# Patient Record
Sex: MALE | Race: WHITE | NOT HISPANIC OR LATINO | Employment: FULL TIME | URBAN - METROPOLITAN AREA
[De-identification: names, ages, dates, MRNs, and addresses within clinical notes are randomized per-mention and may not be internally consistent; named-entity substitution may affect disease eponyms.]

---

## 2018-12-03 ENCOUNTER — HOSPITAL ENCOUNTER (EMERGENCY)
Facility: HOSPITAL | Age: 43
Discharge: HOME | End: 2018-12-03
Attending: EMERGENCY MEDICINE | Admitting: EMERGENCY MEDICINE

## 2018-12-03 ENCOUNTER — APPOINTMENT (OUTPATIENT)
Dept: GENERAL RADIOLOGY | Facility: HOSPITAL | Age: 43
End: 2018-12-03

## 2018-12-03 VITALS
HEART RATE: 68 BPM | BODY MASS INDEX: 19.26 KG/M2 | WEIGHT: 130 LBS | TEMPERATURE: 97.5 F | OXYGEN SATURATION: 99 % | DIASTOLIC BLOOD PRESSURE: 86 MMHG | SYSTOLIC BLOOD PRESSURE: 117 MMHG | HEIGHT: 69 IN | RESPIRATION RATE: 15 BRPM

## 2018-12-03 DIAGNOSIS — R07.2 PRECORDIAL CHEST PAIN: Primary | ICD-10-CM

## 2018-12-03 LAB
ALBUMIN SERPL-MCNC: 3.7 G/DL (ref 3.5–5.2)
ALBUMIN/GLOB SERPL: 1.7 G/DL
ALP SERPL-CCNC: 65 U/L (ref 39–117)
ALT SERPL W P-5'-P-CCNC: 15 U/L (ref 1–41)
ANION GAP SERPL CALCULATED.3IONS-SCNC: 7.8 MMOL/L
AST SERPL-CCNC: 19 U/L (ref 1–40)
BASOPHILS # BLD AUTO: 0.03 10*3/MM3 (ref 0–0.2)
BASOPHILS NFR BLD AUTO: 0.2 % (ref 0–1.5)
BILIRUB SERPL-MCNC: 0.2 MG/DL (ref 0.1–1.2)
BUN BLD-MCNC: 15 MG/DL (ref 6–20)
BUN/CREAT SERPL: 18.5 (ref 7–25)
CALCIUM SPEC-SCNC: 9 MG/DL (ref 8.6–10.5)
CHLORIDE SERPL-SCNC: 105 MMOL/L (ref 98–107)
CO2 SERPL-SCNC: 25.2 MMOL/L (ref 22–29)
CREAT BLD-MCNC: 0.81 MG/DL (ref 0.76–1.27)
DEPRECATED RDW RBC AUTO: 47.6 FL (ref 37–54)
EOSINOPHIL # BLD AUTO: 0.48 10*3/MM3 (ref 0–0.7)
EOSINOPHIL NFR BLD AUTO: 3.7 % (ref 0.3–6.2)
ERYTHROCYTE [DISTWIDTH] IN BLOOD BY AUTOMATED COUNT: 13.3 % (ref 11.5–14.5)
GFR SERPL CREATININE-BSD FRML MDRD: 103 ML/MIN/1.73
GLOBULIN UR ELPH-MCNC: 2.2 GM/DL
GLUCOSE BLD-MCNC: 102 MG/DL (ref 65–99)
HCT VFR BLD AUTO: 46.7 % (ref 40.4–52.2)
HGB BLD-MCNC: 15.1 G/DL (ref 13.7–17.6)
IMM GRANULOCYTES # BLD: 0.04 10*3/MM3 (ref 0–0.03)
IMM GRANULOCYTES NFR BLD: 0.3 % (ref 0–0.5)
INR PPP: 1.19 (ref 0.9–1.1)
LYMPHOCYTES # BLD AUTO: 3 10*3/MM3 (ref 0.9–4.8)
LYMPHOCYTES NFR BLD AUTO: 23.4 % (ref 19.6–45.3)
MCH RBC QN AUTO: 31.2 PG (ref 27–32.7)
MCHC RBC AUTO-ENTMCNC: 32.3 G/DL (ref 32.6–36.4)
MCV RBC AUTO: 96.5 FL (ref 79.8–96.2)
MONOCYTES # BLD AUTO: 0.52 10*3/MM3 (ref 0.2–1.2)
MONOCYTES NFR BLD AUTO: 4.1 % (ref 5–12)
NEUTROPHILS # BLD AUTO: 8.74 10*3/MM3 (ref 1.9–8.1)
NEUTROPHILS NFR BLD AUTO: 68.3 % (ref 42.7–76)
PLATELET # BLD AUTO: 191 10*3/MM3 (ref 140–500)
PMV BLD AUTO: 10.4 FL (ref 6–12)
POTASSIUM BLD-SCNC: 4.2 MMOL/L (ref 3.5–5.2)
PROT SERPL-MCNC: 5.9 G/DL (ref 6–8.5)
PROTHROMBIN TIME: 14.9 SECONDS (ref 11.7–14.2)
RBC # BLD AUTO: 4.84 10*6/MM3 (ref 4.6–6)
SODIUM BLD-SCNC: 138 MMOL/L (ref 136–145)
TROPONIN T SERPL-MCNC: <0.01 NG/ML (ref 0–0.03)
TROPONIN T SERPL-MCNC: <0.01 NG/ML (ref 0–0.03)
WBC NRBC COR # BLD: 12.81 10*3/MM3 (ref 4.5–10.7)

## 2018-12-03 PROCEDURE — 85610 PROTHROMBIN TIME: CPT | Performed by: EMERGENCY MEDICINE

## 2018-12-03 PROCEDURE — 84484 ASSAY OF TROPONIN QUANT: CPT | Performed by: EMERGENCY MEDICINE

## 2018-12-03 PROCEDURE — 93005 ELECTROCARDIOGRAM TRACING: CPT | Performed by: EMERGENCY MEDICINE

## 2018-12-03 PROCEDURE — 99285 EMERGENCY DEPT VISIT HI MDM: CPT

## 2018-12-03 PROCEDURE — 93005 ELECTROCARDIOGRAM TRACING: CPT

## 2018-12-03 PROCEDURE — 85025 COMPLETE CBC W/AUTO DIFF WBC: CPT | Performed by: EMERGENCY MEDICINE

## 2018-12-03 PROCEDURE — 71046 X-RAY EXAM CHEST 2 VIEWS: CPT

## 2018-12-03 PROCEDURE — 80053 COMPREHEN METABOLIC PANEL: CPT | Performed by: EMERGENCY MEDICINE

## 2018-12-03 PROCEDURE — 93010 ELECTROCARDIOGRAM REPORT: CPT | Performed by: INTERNAL MEDICINE

## 2018-12-03 RX ORDER — SODIUM CHLORIDE 0.9 % (FLUSH) 0.9 %
10 SYRINGE (ML) INJECTION AS NEEDED
Status: DISCONTINUED | OUTPATIENT
Start: 2018-12-03 | End: 2018-12-03 | Stop reason: HOSPADM

## 2018-12-03 NOTE — ED PROVIDER NOTES
" EMERGENCY DEPARTMENT ENCOUNTER    Room Number:  11/11  Date seen:  12/3/2018  Time seen: 3:16 PM  PCP: Provider, No Known  Historian: Patient      HPI:  Chief Complaint: Chest Pain  A complete HPI/ROS/PMH/PSH/SH/FH are unobtainable due to: N/A  Context: Jus Leary is a 43 y.o. male who presents to the ED c/o constant \"tight\" left-sided chest pain onset at about 10:00 AM today. Pt reports that his chest pain began shortly after pt donated plasma today. Pt denies dyspnea, diaphoresis, nausea, vomiting, palpitations, abdominal pain, BLE edema, bilateral/unilateral calf pain, and dizziness/lightheadedness. Pt also denies family h/o early cardiac disease. No recent trauma/surgery/immobilization in the past 4 weeks. No h/o VTE. No hemoptysis. No unilateral leg swelling. No exogenous estrogen use.   Age < 50. HR < 100. SpO2 >94% on room air.  There are no other complaints at this time.     Pain Location: Left side of the chest  Radiation: None  Quality: \"tightness\"  Intensity/Severity: Moderate  Duration: Onset at about 10:00 AM today  Onset quality: Gradual in onset  Timing: Constant  Progression: Unchanged  Aggravating Factors: Nothing  Alleviating Factors: Nothing  Previous Episodes: None  Treatment before arrival: Unknown  Associated Symptoms: None        PAST MEDICAL HISTORY  Active Ambulatory Problems     Diagnosis Date Noted   • No Active Ambulatory Problems     Resolved Ambulatory Problems     Diagnosis Date Noted   • No Resolved Ambulatory Problems     No Additional Past Medical History         PAST SURGICAL HISTORY  History reviewed. No pertinent surgical history.      FAMILY HISTORY  History reviewed. No pertinent family history.      SOCIAL HISTORY  Social History     Socioeconomic History   • Marital status:      Spouse name: Not on file   • Number of children: Not on file   • Years of education: Not on file   • Highest education level: Not on file   Social Needs   • Financial resource strain: Not " "on file   • Food insecurity - worry: Not on file   • Food insecurity - inability: Not on file   • Transportation needs - medical: Not on file   • Transportation needs - non-medical: Not on file   Occupational History   • Not on file   Tobacco Use   • Smoking status: Never Smoker   Substance and Sexual Activity   • Alcohol use: No     Frequency: Never   • Drug use: No   • Sexual activity: Not on file   Other Topics Concern   • Not on file   Social History Narrative   • Not on file         ALLERGIES  Penicillins        REVIEW OF SYSTEMS  Review of Systems   Constitutional: Negative for diaphoresis and fever.   HENT: Negative for sore throat.    Respiratory: Negative for shortness of breath.    Cardiovascular: Positive for chest pain (constant, \"tight\" L sided). Negative for palpitations and leg swelling.   Gastrointestinal: Negative for abdominal pain, nausea and vomiting.   Endocrine: Negative for polyuria.   Genitourinary: Negative for dysuria.   Musculoskeletal: Negative for myalgias (calf pain) and neck pain.   Skin: Negative for rash.   Neurological: Negative for dizziness, light-headedness and headaches.   All other systems reviewed and are negative.             PHYSICAL EXAM  ED Triage Vitals   Temp Heart Rate Resp BP SpO2   12/03/18 1343 12/03/18 1343 12/03/18 1343 12/03/18 1403 12/03/18 1343   97.5 °F (36.4 °C) 97 16 110/88 98 %      Temp src Heart Rate Source Patient Position BP Location FiO2 (%)   12/03/18 1343 -- -- -- --   Tympanic             GENERAL: Pt is not distressed  HENT: nares patent  EYES: no scleral icterus  CV: regular rhythm, regular rate. Reproducible anterior chest wall tenderness  RESPIRATORY: normal effort. Lungs are CTAB.  ABDOMEN: soft and nontender  MUSCULOSKELETAL: no deformity. No BLE edema. Calves are nontender bilaterally  NEURO: alert, PATTON, FC  SKIN: warm, dry    Vital signs and nursing notes reviewed.          LAB RESULTS  Recent Results (from the past 24 hour(s))   Comprehensive " Metabolic Panel    Collection Time: 12/03/18  2:22 PM   Result Value Ref Range    Glucose 102 (H) 65 - 99 mg/dL    BUN 15 6 - 20 mg/dL    Creatinine 0.81 0.76 - 1.27 mg/dL    Sodium 138 136 - 145 mmol/L    Potassium 4.2 3.5 - 5.2 mmol/L    Chloride 105 98 - 107 mmol/L    CO2 25.2 22.0 - 29.0 mmol/L    Calcium 9.0 8.6 - 10.5 mg/dL    Total Protein 5.9 (L) 6.0 - 8.5 g/dL    Albumin 3.70 3.50 - 5.20 g/dL    ALT (SGPT) 15 1 - 41 U/L    AST (SGOT) 19 1 - 40 U/L    Alkaline Phosphatase 65 39 - 117 U/L    Total Bilirubin 0.2 0.1 - 1.2 mg/dL    eGFR Non African Amer 103 >60 mL/min/1.73    Globulin 2.2 gm/dL    A/G Ratio 1.7 g/dL    BUN/Creatinine Ratio 18.5 7.0 - 25.0    Anion Gap 7.8 mmol/L   Protime-INR    Collection Time: 12/03/18  2:22 PM   Result Value Ref Range    Protime 14.9 (H) 11.7 - 14.2 Seconds    INR 1.19 (H) 0.90 - 1.10   Troponin    Collection Time: 12/03/18  2:22 PM   Result Value Ref Range    Troponin T <0.010 0.000 - 0.030 ng/mL   CBC Auto Differential    Collection Time: 12/03/18  2:22 PM   Result Value Ref Range    WBC 12.81 (H) 4.50 - 10.70 10*3/mm3    RBC 4.84 4.60 - 6.00 10*6/mm3    Hemoglobin 15.1 13.7 - 17.6 g/dL    Hematocrit 46.7 40.4 - 52.2 %    MCV 96.5 (H) 79.8 - 96.2 fL    MCH 31.2 27.0 - 32.7 pg    MCHC 32.3 (L) 32.6 - 36.4 g/dL    RDW 13.3 11.5 - 14.5 %    RDW-SD 47.6 37.0 - 54.0 fl    MPV 10.4 6.0 - 12.0 fL    Platelets 191 140 - 500 10*3/mm3    Neutrophil % 68.3 42.7 - 76.0 %    Lymphocyte % 23.4 19.6 - 45.3 %    Monocyte % 4.1 (L) 5.0 - 12.0 %    Eosinophil % 3.7 0.3 - 6.2 %    Basophil % 0.2 0.0 - 1.5 %    Immature Grans % 0.3 0.0 - 0.5 %    Neutrophils, Absolute 8.74 (H) 1.90 - 8.10 10*3/mm3    Lymphocytes, Absolute 3.00 0.90 - 4.80 10*3/mm3    Monocytes, Absolute 0.52 0.20 - 1.20 10*3/mm3    Eosinophils, Absolute 0.48 0.00 - 0.70 10*3/mm3    Basophils, Absolute 0.03 0.00 - 0.20 10*3/mm3    Immature Grans, Absolute 0.04 (H) 0.00 - 0.03 10*3/mm3   Troponin    Collection Time: 12/03/18   4:21 PM   Result Value Ref Range    Troponin T <0.010 0.000 - 0.030 ng/mL       Ordered the above labs and reviewed the results.        RADIOLOGY  XR Chest 2 View   Final Result   No focal pulmonary consolidation. Follow-up as clinical   indications persist.       This report was finalized on 12/3/2018 3:47 PM by Dr. Glenroy Mann M.D.                 Ordered the above noted radiological studies. Reviewed by me in PACS.           PROCEDURES  Procedures        EKG:           EKG time: 13:51  Rhythm/Rate: NSR rate 72  P waves and AR: Normal P waves  QRS, axis: Incomplete RBBB   ST and T waves: Nonspecific ST changes     Interpreted Contemporaneously by me, independently viewed  No old for comparison.      HEART SCORE:    History  Highly suspicious              2    Moderately suspicious             1    Slightly or non-suspicious             0    ECG  Significant ST depression              2    Nonspecific repol disturbance       1    Normal                           0    Age  > or = 65                          2     45-65                           1    < or = 44                          0    Risk factors (hypercholesterolemia, HTN, DM, smoking, pos fam hx, obesity)                            > or = to 3 RF for atherosclerotic dx   2    1 or 2                 1    No risk factors    0    Troponin > or = 3x normal limit               2    1-3x normal limit    1    < or = Normal limit    0        HEART Score Key:  Scores 0-3: 0.9-1.7% risk of adverse cardiac event. In the HEART Score study, these patients were discharged (0.99% in the retrospective study, 1.7% in the prospective study)  Scores 4-6: 12-16.6% risk of adverse cardiac event. In the HEART Score study, these patients were admitted to the hospital. (11.6% retrospective, 16.6% prospective)  Scores ?7: 50-65% risk of adverse cardiac event. In the HEART Score study, these patients were candidates for early invasive measures. (65.2% retrospective, 50.1%  prospective)      This patient's HEART score is 2.          MEDICATIONS GIVEN IN ER  Medications   sodium chloride 0.9 % flush 10 mL (not administered)                   PROGRESS AND CONSULTS   1524 Ordered troponin and CXR for further evaluation.    1708 Rechecked with pt, who is resting comfortably, and discussed that there is no acute abnormalities in labs and CXR. Plan to discharge. Pt should f/u with cardiology. Pt understands and agrees with the plan, all questions answered.        MEDICAL DECISION MAKING      MDM  Number of Diagnoses or Management Options  Precordial chest pain:   Diagnosis management comments: DDx includes ACS, PE, PNA, TAD, PTX, MSK pain, dyspepsia, anxiety, myocarditis/pericarditis, esophageal rupture, pancreatitis.     HEART score 2.  Troponin negative x 2.     PERC negative.     Sent message for outpatient stress test. Gave good RTER precautions. I have low suspicion for TAD or PE.          Amount and/or Complexity of Data Reviewed  Clinical lab tests: ordered and reviewed (Troponin= <0.010, INR= 1.19, WBC= 12.81)  Tests in the radiology section of CPT®: ordered and reviewed (CXR shows nothing acute.)  Tests in the medicine section of CPT®: reviewed and ordered (See procedure notes for EKG.)  Decide to obtain previous medical records or to obtain history from someone other than the patient: yes  Independent visualization of images, tracings, or specimens: yes (No pneumonia)    Patient Progress  Patient progress: stable             DIAGNOSIS  Final diagnoses:   Precordial chest pain         DISPOSITION  DISCHARGE    Patient discharged in stable condition.    Reviewed implications of results, diagnosis, meds, responsibility to follow up, warning signs and symptoms of possible worsening, potential complications and reasons to return to ER, including new or worsening sxs.    Patient/Family voiced understanding of above instructions.    Discussed plan for discharge, as there is no emergent  indication for admission. Patient referred to primary care provider for BP management due to today's BP. Pt/family is agreeable and understands need for follow up and repeat testing.  Pt is aware that discharge does not mean that nothing is wrong but it indicates no emergency is present that requires admission and they must continue care with follow-up as given below or physician of their choice.     FOLLOW-UP  [facility]  [address]  [facility] [address]  [phone]  Schedule an appointment as soon as possible for a visit in 3 days  for stress test         Medication List      No changes were made to your prescriptions during this visit.                 Latest Documented Vital Signs:  As of 5:10 PM  BP- 117/86 HR- 65 Temp- 97.5 °F (36.4 °C) (Tympanic) O2 sat- 97%        --  Documentation assistance provided by sherly Black and Cheryl Dave for Dr. Viral MD.  Information recorded by the scribe was done at my direction and has been verified and validated by me.                   Rico Black  12/03/18 1524       Cheryl Dave  12/03/18 2430       Mack Stratton II, MD  12/04/18 6314

## 2018-12-03 NOTE — ED NOTES
Pt reports before giving plasma pt began to have an episode of feeling light headed and diaphoretic. Pt reports after having left sided chest pain and left arm numbness. Pt was able to hold left arm.     Lachelle Real RN  12/03/18 1753

## 2018-12-04 ENCOUNTER — HOSPITAL ENCOUNTER (EMERGENCY)
Facility: HOSPITAL | Age: 43
Discharge: HOME | End: 2018-12-04
Attending: EMERGENCY MEDICINE | Admitting: EMERGENCY MEDICINE

## 2018-12-04 VITALS
HEIGHT: 69 IN | RESPIRATION RATE: 18 BRPM | SYSTOLIC BLOOD PRESSURE: 107 MMHG | WEIGHT: 131.5 LBS | DIASTOLIC BLOOD PRESSURE: 80 MMHG | TEMPERATURE: 96.9 F | BODY MASS INDEX: 19.48 KG/M2 | OXYGEN SATURATION: 98 % | HEART RATE: 62 BPM

## 2018-12-04 DIAGNOSIS — R07.2 PRECORDIAL CHEST PAIN: Primary | ICD-10-CM

## 2018-12-04 LAB
ALBUMIN SERPL-MCNC: 4 G/DL (ref 3.5–5.2)
ALBUMIN/GLOB SERPL: 1.8 G/DL
ALP SERPL-CCNC: 72 U/L (ref 39–117)
ALT SERPL W P-5'-P-CCNC: 18 U/L (ref 1–41)
ANION GAP SERPL CALCULATED.3IONS-SCNC: 6.4 MMOL/L
AST SERPL-CCNC: 22 U/L (ref 1–40)
BASOPHILS # BLD AUTO: 0.06 10*3/MM3 (ref 0–0.2)
BASOPHILS NFR BLD AUTO: 0.6 % (ref 0–1.5)
BILIRUB SERPL-MCNC: <0.2 MG/DL (ref 0.1–1.2)
BUN BLD-MCNC: 10 MG/DL (ref 6–20)
BUN/CREAT SERPL: 12.3 (ref 7–25)
CALCIUM SPEC-SCNC: 9.2 MG/DL (ref 8.6–10.5)
CHLORIDE SERPL-SCNC: 106 MMOL/L (ref 98–107)
CO2 SERPL-SCNC: 26.6 MMOL/L (ref 22–29)
CREAT BLD-MCNC: 0.81 MG/DL (ref 0.76–1.27)
D DIMER PPP FEU-MCNC: <0.27 MCGFEU/ML (ref 0–0.49)
DEPRECATED RDW RBC AUTO: 45.3 FL (ref 37–54)
EOSINOPHIL # BLD AUTO: 0.62 10*3/MM3 (ref 0–0.7)
EOSINOPHIL NFR BLD AUTO: 6 % (ref 0.3–6.2)
ERYTHROCYTE [DISTWIDTH] IN BLOOD BY AUTOMATED COUNT: 13.4 % (ref 11.5–14.5)
GFR SERPL CREATININE-BSD FRML MDRD: 103 ML/MIN/1.73
GLOBULIN UR ELPH-MCNC: 2.2 GM/DL
GLUCOSE BLD-MCNC: 90 MG/DL (ref 65–99)
HCT VFR BLD AUTO: 44.4 % (ref 40.4–52.2)
HGB BLD-MCNC: 15.5 G/DL (ref 13.7–17.6)
IMM GRANULOCYTES # BLD: 0.02 10*3/MM3 (ref 0–0.03)
IMM GRANULOCYTES NFR BLD: 0.2 % (ref 0–0.5)
LYMPHOCYTES # BLD AUTO: 2.96 10*3/MM3 (ref 0.9–4.8)
LYMPHOCYTES NFR BLD AUTO: 28.5 % (ref 19.6–45.3)
MCH RBC QN AUTO: 32.2 PG (ref 27–32.7)
MCHC RBC AUTO-ENTMCNC: 34.9 G/DL (ref 32.6–36.4)
MCV RBC AUTO: 92.1 FL (ref 79.8–96.2)
MONOCYTES # BLD AUTO: 0.48 10*3/MM3 (ref 0.2–1.2)
MONOCYTES NFR BLD AUTO: 4.6 % (ref 5–12)
NEUTROPHILS # BLD AUTO: 6.27 10*3/MM3 (ref 1.9–8.1)
NEUTROPHILS NFR BLD AUTO: 60.3 % (ref 42.7–76)
PLATELET # BLD AUTO: 199 10*3/MM3 (ref 140–500)
PMV BLD AUTO: 10.5 FL (ref 6–12)
POTASSIUM BLD-SCNC: 4.4 MMOL/L (ref 3.5–5.2)
PROT SERPL-MCNC: 6.2 G/DL (ref 6–8.5)
RBC # BLD AUTO: 4.82 10*6/MM3 (ref 4.6–6)
SODIUM BLD-SCNC: 139 MMOL/L (ref 136–145)
TROPONIN T SERPL-MCNC: <0.01 NG/ML (ref 0–0.03)
WBC NRBC COR # BLD: 10.39 10*3/MM3 (ref 4.5–10.7)

## 2018-12-04 PROCEDURE — 85379 FIBRIN DEGRADATION QUANT: CPT | Performed by: PHYSICIAN ASSISTANT

## 2018-12-04 PROCEDURE — 93005 ELECTROCARDIOGRAM TRACING: CPT

## 2018-12-04 PROCEDURE — 80053 COMPREHEN METABOLIC PANEL: CPT | Performed by: PHYSICIAN ASSISTANT

## 2018-12-04 PROCEDURE — 84484 ASSAY OF TROPONIN QUANT: CPT | Performed by: PHYSICIAN ASSISTANT

## 2018-12-04 PROCEDURE — 93010 ELECTROCARDIOGRAM REPORT: CPT | Performed by: INTERNAL MEDICINE

## 2018-12-04 PROCEDURE — 99283 EMERGENCY DEPT VISIT LOW MDM: CPT

## 2018-12-04 PROCEDURE — 85025 COMPLETE CBC W/AUTO DIFF WBC: CPT | Performed by: PHYSICIAN ASSISTANT

## 2018-12-04 PROCEDURE — 93005 ELECTROCARDIOGRAM TRACING: CPT | Performed by: EMERGENCY MEDICINE

## 2018-12-04 RX ORDER — SODIUM CHLORIDE 0.9 % (FLUSH) 0.9 %
10 SYRINGE (ML) INJECTION AS NEEDED
Status: DISCONTINUED | OUTPATIENT
Start: 2018-12-04 | End: 2018-12-04 | Stop reason: HOSPADM

## 2018-12-04 NOTE — DISCHARGE INSTRUCTIONS
Call the cardiologist office tomorrow to schedule an outpatient stress test. Return to the Emergency Department if you have any worsening pain, shortness of breath, if you develop a fever, or any other concerns.

## 2018-12-04 NOTE — ED PROVIDER NOTES
"Pt is a 43 y.o. male who presents to the ED complaining of CP that started yesterday. Pt was seen yesterday for \"tight\" diffuse chest pain and went home with negative work up. Pt states the pain woke him up this morning at 0400 and is \"sharp\" and left of sternum. Pt states pain is exacerbated with walking. Pt denies N/V, or SOA. Pt doesn't smoke. Pt denies Hx of DM.     On exam,  Constitutional: Mild distress  Cardiovascular: HRRR, no murmur   Pulmonary/Chest: Lungs CTAB. Pain reproducible with left chest wall and movement     Labs reviewed.   Troponin negative. D-Dimer negative.     Plan: D/w Cardiology       MD ATTESTATION NOTE    The TORI and I have discussed this patient's history, physical exam, and treatment plan.  I have reviewed the documentation and personally had a face to face interaction with the patient. I affirm the documentation and agree with the treatment and plan.  The attached note describes my personal findings.      Documentation assistance provided by sherly Gordillo for Dr. Ling. Information recorded by the scribe was done at my direction and has been verified and validated by me.             Jus Gordillo  12/04/18 2516       Lucho Ling MD  12/04/18 2206    "

## 2018-12-04 NOTE — ED PROVIDER NOTES
EMERGENCY DEPARTMENT ENCOUNTER    CHIEF COMPLAINT  Chief Complaint: Chest Pain  History given by: Patient  History limited by:   Room Number: 26/26  PMD: Provider, No Known      HPI:  Pt is a 43 y.o. male who presents complaining of R lower chest pain that began today at 1200 while at rest. Pt describes the pain as a sharpness. He denies any SOA, palpitations, or N/V. Pt was seen here for same yesterday, but states the pain at that time was a pressure. Pt denies any cardiac hx. He reports a family cardiac hx, but no early death.     Duration: 4.75 hours  Onset: sudden  Timing: waxing and waning  Location: R lower chest  Radiation: none  Quality: sharpness  Intensity/Severity: moderate  Progression: unchanged  Associated Symptoms: none  Aggravating Factors: none  Alleviating Factors: none  Previous Episodes: similar episode yesterday. Quality is different today.  Treatment before arrival: seen here for same yesterday.     PAST MEDICAL HISTORY  Active Ambulatory Problems     Diagnosis Date Noted   • No Active Ambulatory Problems     Resolved Ambulatory Problems     Diagnosis Date Noted   • No Resolved Ambulatory Problems     No Additional Past Medical History       PAST SURGICAL HISTORY  History reviewed. No pertinent surgical history.    FAMILY HISTORY  History reviewed. No pertinent family history.    SOCIAL HISTORY  Social History     Socioeconomic History   • Marital status:      Spouse name: Not on file   • Number of children: Not on file   • Years of education: Not on file   • Highest education level: Not on file   Social Needs   • Financial resource strain: Not on file   • Food insecurity - worry: Not on file   • Food insecurity - inability: Not on file   • Transportation needs - medical: Not on file   • Transportation needs - non-medical: Not on file   Occupational History   • Not on file   Tobacco Use   • Smoking status: Never Smoker   Substance and Sexual Activity   • Alcohol use: No     Frequency:  Never   • Drug use: No   • Sexual activity: Not on file   Other Topics Concern   • Not on file   Social History Narrative   • Not on file       ALLERGIES  Penicillins    REVIEW OF SYSTEMS  Review of Systems   Constitutional: Negative for activity change, appetite change and fever.   Respiratory: Negative for cough and shortness of breath.    Cardiovascular: Positive for chest pain. Negative for leg swelling.   Gastrointestinal: Negative for abdominal pain, diarrhea and vomiting.   Genitourinary: Negative for decreased urine volume and dysuria.   Musculoskeletal: Negative for neck pain.   Skin: Negative for rash and wound.   Neurological: Negative for weakness, numbness and headaches.   All other systems reviewed and are negative.      PHYSICAL EXAM  ED Triage Vitals   Temp Heart Rate Resp BP SpO2   12/04/18 1542 12/04/18 1542 12/04/18 1542 12/04/18 1600 12/04/18 1542   96.9 °F (36.1 °C) 94 18 122/89 97 %      Temp src Heart Rate Source Patient Position BP Location FiO2 (%)   12/04/18 1542 12/04/18 1542 -- -- --   Tympanic Monitor          Physical Exam   Constitutional: He is oriented to person, place, and time. No distress.   HENT:   Head: Normocephalic and atraumatic.   Eyes: EOM are normal. Pupils are equal, round, and reactive to light.   Neck: Normal range of motion. Neck supple.   Cardiovascular: Normal rate, regular rhythm and normal heart sounds.   Pulmonary/Chest: Effort normal and breath sounds normal. No respiratory distress. He exhibits tenderness (mild right sided).   Abdominal: Soft. There is no tenderness. There is no rebound and no guarding.   Musculoskeletal: Normal range of motion. He exhibits no edema.   Neurological: He is alert and oriented to person, place, and time. He has normal sensation and normal strength.   Skin: Skin is warm and dry.   Psychiatric: Mood and affect normal.   Nursing note and vitals reviewed.      LAB RESULTS  Lab Results (last 24 hours)     Procedure Component Value Units  Date/Time    CBC & Differential [[id number]] Collected:  12/04/18 1650    Specimen:  Blood Updated:  12/04/18 1705    Narrative:       The following orders were created for panel order CBC & Differential.  Procedure                               Abnormality         Status                     ---------                               -----------         ------                     CBC Auto Differential[[id number]]        Abnormal            Final result                 Please view results for these tests on the individual orders.    Troponin [[id number]]  (Normal) Collected:  12/04/18 1650    Specimen:  Blood Updated:  12/04/18 1721     Troponin T <0.010 ng/mL     Narrative:       Troponin T Reference Ranges:  Less than 0.03 ng/mL:    Negative for AMI  0.03 to 0.09 ng/mL:      Indeterminant for AMI  Greater than 0.09 ng/mL: Positive for AMI    Comprehensive Metabolic Panel [[id number]] Collected:  12/04/18 1650    Specimen:  Blood Updated:  12/04/18 1725     Glucose 90 mg/dL      BUN 10 mg/dL      Creatinine 0.81 mg/dL      Sodium 139 mmol/L      Potassium 4.4 mmol/L      Chloride 106 mmol/L      CO2 26.6 mmol/L      Calcium 9.2 mg/dL      Total Protein 6.2 g/dL      Albumin 4.00 g/dL      ALT (SGPT) 18 U/L      AST (SGOT) 22 U/L      Alkaline Phosphatase 72 U/L      Total Bilirubin <0.2 mg/dL      eGFR Non African Amer 103 mL/min/1.73      Globulin 2.2 gm/dL      A/G Ratio 1.8 g/dL      BUN/Creatinine Ratio 12.3     Anion Gap 6.4 mmol/L     D-dimer, Quantitative [[id number]]  (Normal) Collected:  12/04/18 1650    Specimen:  Blood Updated:  12/04/18 1718     D-Dimer, Quantitative <0.27 MCGFEU/mL     Narrative:       The [employer]ago D-Dimer test used in conjunction with a clinical pretest probability (PTP) assessment model, has been approved by the FDA to rule out the presence of venous thromboembolism (VTE) in outpatients suspected of deep venous thrombosis (DVT) or pulmonary embolism (PE).     CBC Auto Differential [[id number]]   (Abnormal) Collected:  12/04/18 1650    Specimen:  Blood Updated:  12/04/18 1705     WBC 10.39 10*3/mm3      RBC 4.82 10*6/mm3      Hemoglobin 15.5 g/dL      Hematocrit 44.4 %      MCV 92.1 fL      MCH 32.2 pg      MCHC 34.9 g/dL      RDW 13.4 %      RDW-SD 45.3 fl      MPV 10.5 fL      Platelets 199 10*3/mm3      Neutrophil % 60.3 %      Lymphocyte % 28.5 %      Monocyte % 4.6 %      Eosinophil % 6.0 %      Basophil % 0.6 %      Immature Grans % 0.2 %      Neutrophils, Absolute 6.27 10*3/mm3      Lymphocytes, Absolute 2.96 10*3/mm3      Monocytes, Absolute 0.48 10*3/mm3      Eosinophils, Absolute 0.62 10*3/mm3      Basophils, Absolute 0.06 10*3/mm3      Immature Grans, Absolute 0.02 10*3/mm3           I ordered the above labs and reviewed the results     PROCEDURES  Procedures  EKG          EKG time: 1747  Rhythm/Rate: 79  P waves and IA: Normal  QRS, axis: IRBBB   ST and T waves: No specific changes      Interpreted Contemporaneously by me, independently viewed  By me, no fountain compared to prior 12/3/2018      HEARTSCORE    History  Highly suspicious              2    Moderately suspicious             1    Slightly or non-suspicious             0    ECG  Significant ST depression              2    Nonspecific repol disturbance            1    Normal                           0    Age  > or = 65                          2     46-65                           1    < or = 45                          0    Risk factors (hypercholesterolemia, HTN, DM, smoking, pos fam hx, obesity)                            > or = to 3 RF for atherosclerotic dx   2    1 or 2                 1    No risk factors                0    Troponin > or = 3x normal limit               2    1-3x normal limit    1    < or = Normal limit    0    Score  0 - 3 is low risk (0.9-1.7% risk of adverse cardiac event)  Score  4 - 6 is moderate risk (12-16.6% risk of adverse cardiac event)  Score  6 - 9 is high risk (50-65% risk of adverse cardiac  event)    This patient's HEART score is 0         PROGRESS AND CONSULTS     4:51 PM  Ordered blood work, EKG    1830  Pt has negative work-up, however due to pt's continued symptoms, will discuss with Cardiology.      1840  Discussed with Dr. Redding, will see in her office      MEDICAL DECISION MAKING  Results were reviewed/discussed with the patient and they were also made aware of online access. Pt also made aware that some labs, such as cultures, will not be resulted during ER visit and follow up with PMD is necessary.     MDM  Number of Diagnoses or Management Options  Precordial chest pain:      Amount and/or Complexity of Data Reviewed  Clinical lab tests: reviewed and ordered (D-dimer neg, Troponin neg)  Tests in the medicine section of CPT®: ordered and reviewed (EKG - See EKG procedure note  )  Review and summarize past medical records: yes (Seen here for same yesterday. CXR and troponin negative. Heart score of 2. PERC negative. D/C with [facility] follow-up )           DIAGNOSIS  Final diagnoses:   Precordial chest pain       DISPOSITION  DISCHARGE    Patient discharged in stable condition.    Reviewed implications of results, diagnosis, meds, responsibility to follow up, warning signs and symptoms of possible worsening, potential complications and reasons to return to ER.    Patient/Family voiced understanding of above instructions.    Discussed plan for discharge, as there is no emergent indication for admission. Patient referred to primary care provider for BP management due to today's BP. Pt/family is agreeable and understands need for follow up and repeat testing.  Pt is aware that discharge does not mean that nothing is wrong but it indicates no emergency is present that requires admission and they must continue care with follow-up as given below or physician of their choice.     FOLLOW-UP  [facility]  [address]  [facility]  [address]  [phone]  Call in 1 day  For Cardiologist follow-up, to schedule an outpatient stress test         Medication List      No changes were made to your prescriptions during this visit.           Latest Documented Vital Signs:  As of 11:40 PM  BP- 107/80 HR- 62 Temp- 96.9 °F (36.1 °C) (Tympanic) O2 sat- 98%    --  Documentation assistance provided by sherly Marino for Sanjeev Castro PA-C.  Information recorded by the scribe was done at my direction and has been verified and validated by me.     Deejay Marino  12/04/18 4252       Sanjeev Castro PA  12/04/18 6435

## 2018-12-07 ENCOUNTER — HOSPITAL ENCOUNTER (EMERGENCY)
Facility: HOSPITAL | Age: 43
Discharge: HOME | End: 2018-12-07
Attending: FAMILY MEDICINE | Admitting: FAMILY MEDICINE

## 2018-12-07 VITALS
TEMPERATURE: 97.2 F | RESPIRATION RATE: 17 BRPM | DIASTOLIC BLOOD PRESSURE: 86 MMHG | SYSTOLIC BLOOD PRESSURE: 115 MMHG | BODY MASS INDEX: 19.58 KG/M2 | HEIGHT: 69 IN | HEART RATE: 67 BPM | OXYGEN SATURATION: 97 % | WEIGHT: 132.2 LBS

## 2018-12-07 DIAGNOSIS — R07.89 ATYPICAL CHEST PAIN: Primary | ICD-10-CM

## 2018-12-07 LAB
HOLD SPECIMEN: NORMAL
HOLD SPECIMEN: NORMAL
TROPONIN T SERPL-MCNC: <0.01 NG/ML (ref 0–0.03)
WHOLE BLOOD HOLD SPECIMEN: NORMAL
WHOLE BLOOD HOLD SPECIMEN: NORMAL

## 2018-12-07 PROCEDURE — 99284 EMERGENCY DEPT VISIT MOD MDM: CPT

## 2018-12-07 PROCEDURE — 25010000002 KETOROLAC TROMETHAMINE PER 15 MG: Performed by: FAMILY MEDICINE

## 2018-12-07 PROCEDURE — 93005 ELECTROCARDIOGRAM TRACING: CPT | Performed by: FAMILY MEDICINE

## 2018-12-07 PROCEDURE — 93005 ELECTROCARDIOGRAM TRACING: CPT

## 2018-12-07 PROCEDURE — 84484 ASSAY OF TROPONIN QUANT: CPT | Performed by: FAMILY MEDICINE

## 2018-12-07 PROCEDURE — 96372 THER/PROPH/DIAG INJ SC/IM: CPT

## 2018-12-07 PROCEDURE — 93010 ELECTROCARDIOGRAM REPORT: CPT | Performed by: INTERNAL MEDICINE

## 2018-12-07 RX ORDER — KETOROLAC TROMETHAMINE 15 MG/ML
10 INJECTION, SOLUTION INTRAMUSCULAR; INTRAVENOUS ONCE
Status: COMPLETED | OUTPATIENT
Start: 2018-12-07 | End: 2018-12-07

## 2018-12-07 RX ORDER — KETOROLAC TROMETHAMINE 15 MG/ML
10 INJECTION, SOLUTION INTRAMUSCULAR; INTRAVENOUS ONCE
Status: DISCONTINUED | OUTPATIENT
Start: 2018-12-07 | End: 2018-12-07

## 2018-12-07 RX ADMIN — KETOROLAC TROMETHAMINE 10 MG: 15 INJECTION, SOLUTION INTRAMUSCULAR; INTRAVENOUS at 17:53

## 2018-12-07 NOTE — ED PROVIDER NOTES
" EMERGENCY DEPARTMENT ENCOUNTER    CHIEF COMPLAINT  Chief Complaint: Chest Pain   History given by: Patient   History limited by: none  Room Number: 15/15  PMD: Provider, No Known      HPI:  Pt is a 43 y.o. male who presents complaining of sudden-onset \"shooting\" chest pain that lasted for 10 seconds today, and has had constant tightness since episode. Pt confirms SOA and diaphoresis, states symptoms worsen upon exertion and lifting of arm. Pt denies nausea and leg swelling. Pt states symptoms have been intermittent for the past week, and affirms he has been seen in ED twice for the same within the past week. Per pt, he has appointment with cardiology in 3 days. Pt states he stopped smoking 1 year ago, has no hx of hypertension and hyperlipidemia, and no suggestive family hx.     Duration:  Episode lasted 10 seconds   Onset: sudden   Timing: intermittent   Location: chest   Radiation: none   Quality: pain   Intensity/Severity: mild   Progression: unchanged   Associated Symptoms: diaphoresis and SOA, chest tightness  Aggravating Factors: exertion and lifting of arm   Alleviating Factors: none   Previous Episodes: Pt has been seen in ED twice for similar.   Treatment before arrival: none    PAST MEDICAL HISTORY  Active Ambulatory Problems     Diagnosis Date Noted   • No Active Ambulatory Problems     Resolved Ambulatory Problems     Diagnosis Date Noted   • No Resolved Ambulatory Problems     No Additional Past Medical History       PAST SURGICAL HISTORY  History reviewed. No pertinent surgical history.    FAMILY HISTORY  Family History   Problem Relation Age of Onset   • Heart failure Father    • Cancer Father    • Sudden death Father        SOCIAL HISTORY  Social History     Socioeconomic History   • Marital status:      Spouse name: Not on file   • Number of children: Not on file   • Years of education: Not on file   • Highest education level: Not on file   Social Needs   • Financial resource strain: Not on " file   • Food insecurity - worry: Not on file   • Food insecurity - inability: Not on file   • Transportation needs - medical: Not on file   • Transportation needs - non-medical: Not on file   Occupational History   • Not on file   Tobacco Use   • Smoking status: Never Smoker   Substance and Sexual Activity   • Alcohol use: No     Frequency: Never   • Drug use: No   • Sexual activity: Not on file   Other Topics Concern   • Not on file   Social History Narrative   • Not on file       ALLERGIES  Penicillins    REVIEW OF SYSTEMS  Review of Systems   Constitutional: Positive for diaphoresis. Negative for activity change, appetite change and fever.   HENT: Negative for congestion and sore throat.    Eyes: Negative.    Respiratory: Positive for chest tightness and shortness of breath. Negative for cough.    Cardiovascular: Positive for chest pain (shooting). Negative for leg swelling.   Gastrointestinal: Negative for abdominal pain, diarrhea and vomiting.   Endocrine: Negative.    Genitourinary: Negative for decreased urine volume and dysuria.   Musculoskeletal: Negative for neck pain.   Skin: Negative for rash and wound.   Allergic/Immunologic: Negative.    Neurological: Negative for weakness, numbness and headaches.   Hematological: Negative.    Psychiatric/Behavioral: Negative.    All other systems reviewed and are negative.      PHYSICAL EXAM  ED Triage Vitals   Temp Heart Rate Resp BP SpO2   12/07/18 1511 12/07/18 1511 12/07/18 1511 12/07/18 1552 12/07/18 1511   97.2 °F (36.2 °C) 75 16 119/89 100 %      Temp src Heart Rate Source Patient Position BP Location FiO2 (%)   -- -- -- -- --              Physical Exam   Constitutional: He is oriented to person, place, and time. No distress.   HENT:   Head: Normocephalic and atraumatic.   Eyes: EOM are normal. Pupils are equal, round, and reactive to light.   Neck: Normal range of motion. Neck supple.   Cardiovascular: Normal rate, regular rhythm and normal heart sounds.    Pulmonary/Chest: Effort normal and breath sounds normal. No respiratory distress.   Abdominal: Soft. There is no tenderness. There is no rebound and no guarding.   Musculoskeletal: Normal range of motion. He exhibits no edema.   Neurological: He is alert and oriented to person, place, and time. He has normal sensation and normal strength.   Skin: Skin is warm and dry.   Psychiatric: Mood and affect normal.   Nursing note and vitals reviewed.      LAB RESULTS  Lab Results (last 24 hours)     Procedure Component Value Units Date/Time    Troponin [[id number]]  (Normal) Collected:  12/07/18 1554    Specimen:  Blood Updated:  12/07/18 1651     Troponin T <0.010 ng/mL     Narrative:       Troponin T Reference Ranges:  Less than 0.03 ng/mL:    Negative for AMI  0.03 to 0.09 ng/mL:      Indeterminant for AMI  Greater than 0.09 ng/mL: Positive for AMI          I ordered the above labs and reviewed the results      PROCEDURES  Procedures  EKG          EKG time: 1515  Rhythm/Rate: NSR 74  P waves and SC: nml  QRS, axis: nonspecific IVCD    ST and T waves: nonspecific ST wave changes      Interpreted Contemporaneously by me, independently viewed  Unchanged compared to prior 12/4/18      PROGRESS AND CONSULTS     1630: Pt rechecked following review of old records. Discussed with pt the normal EKG seen in ED today, as well as multiple recent normal troponins and CXR. I discussed with pt my lack of suspicion for pericarditis due to lack of positional or deep breathing component. Previous troponins and dimer's were negative.   Informed pt of plan to assess troponin, with probable plan for discharge and outpatient cardiology follow up as scheduled. Pt's pain is atypical, and possible musculoskeletal in nature due to pain with movement of arm. Pt states he is comfortable with outpatient follow up in 3 days.     1633: Troponin ordered.     1729: Toradol ordered for pain management.     1735: Pt's troponin is negative and pt will be  discharged following shot of toradol for cardiology follow up as scheduled. Pt directed to take low dosage advil as needed. Pt understands and agrees with plan, all questions addressed.     MEDICAL DECISION MAKING  Results were reviewed/discussed with the patient and they were also made aware of online access. Pt also made aware that some labs, such as cultures, will not be resulted during ER visit and follow up with PMD is necessary.     MDM  Number of Diagnoses or Management Options     Amount and/or Complexity of Data Reviewed  Clinical lab tests: reviewed and ordered (Troponin - negative)  Tests in the medicine section of CPT®: reviewed and ordered (See note)  Review and summarize past medical records: yes (Pt has been seen in ED 2x in the last 3 days for same. Pt had normal d-dimer, normal CXR, and multiple normal troponins on 12/3/18 and 12/4/18. )          HEART Score (for prediction of 6-week risk of major adverse cardiac event) reviewed and/or performed as part of the patient evaluation and treatment planning process.  The result associated with this review/performance is: 2      DIAGNOSIS  Final diagnoses:   Atypical chest pain       DISPOSITION  DISCHARGE    Patient discharged in stable condition.    Reviewed implications of results, diagnosis, meds, responsibility to follow up, warning signs and symptoms of possible worsening, potential complications and reasons to return to ER.    Patient/Family voiced understanding of above instructions.    Discussed plan for discharge, as there is no emergent indication for admission. Patient referred to primary care provider for BP management due to today's BP. Pt/family is agreeable and understands need for follow up and repeat testing.  Pt is aware that discharge does not mean that nothing is wrong but it indicates no emergency is present that requires admission and they must continue care with follow-up as given below or physician of their choice.      FOLLOW-UP  Dayan Redding MD  [address] [address]  [phone]      See Dr Redding as planned Tuesday.         Medication List      No changes were made to your prescriptions during this visit.           Latest Documented Vital Signs:  As of 5:32 PM  BP- 119/89 HR- 64 Temp- 97.2 °F (36.2 °C) O2 sat- 98%    --  Documentation assistance provided by sherly Prakash for Dr. Aquino.  Information recorded by the scribe was done at my direction and has been verified and validated by me.                      Raina Prakash  12/07/18 4154       Domo Aquino MD  12/07/18 8868

## 2018-12-07 NOTE — ED NOTES
Pt reports intermittent midsternal chest pain accompanied with dizziness, sweating, and nausea since Monday. Pt was seen at [facility] on Monday. Pt reports chest pain is 7/10 on 1-10 scale. Pt is AAOX4. Vital signs are stable.      Anna Palacios, RN  12/07/18 4630

## 2018-12-07 NOTE — ED TRIAGE NOTES
"Pt to er stating he had a \"sharp pain shoot down my heart today, lasted about 10 seconds\" \"now I just feel pressure\"  "

## 2019-03-11 ENCOUNTER — HOSPITAL ENCOUNTER (EMERGENCY)
Facility: HOSPITAL | Age: 44
Discharge: HOME | End: 2019-03-11
Attending: EMERGENCY MEDICINE | Admitting: EMERGENCY MEDICINE

## 2019-03-11 VITALS
HEIGHT: 67 IN | WEIGHT: 135 LBS | BODY MASS INDEX: 21.19 KG/M2 | DIASTOLIC BLOOD PRESSURE: 91 MMHG | SYSTOLIC BLOOD PRESSURE: 141 MMHG | OXYGEN SATURATION: 97 % | RESPIRATION RATE: 16 BRPM | HEART RATE: 99 BPM | TEMPERATURE: 97.1 F

## 2019-03-11 DIAGNOSIS — L02.91 ABSCESS: Primary | ICD-10-CM

## 2019-03-11 PROCEDURE — 99283 EMERGENCY DEPT VISIT LOW MDM: CPT

## 2019-03-11 RX ORDER — SULFAMETHOXAZOLE AND TRIMETHOPRIM 800; 160 MG/1; MG/1
1 TABLET ORAL 2 TIMES DAILY
Qty: 10 TABLET | Refills: 0 | Status: SHIPPED | OUTPATIENT
Start: 2019-03-11

## 2019-03-11 NOTE — ED PROVIDER NOTES
EMERGENCY DEPARTMENT ENCOUNTER    CHIEF COMPLAINT  Chief Complaint: R thigh abscess  History given by: Pt  History limited by: Nothing  Room Number:   PMD: Provider, No Known      HPI:  Pt is a 43 y.o. male who presents complaining of a painful R inner thigh abscess that has been present for the last 2 days. Pt reports his pain is worse with movement. Pt denies fever, CP, SOA, or a prior hx of these sx. Pt denies EtOH use or smoking.     Duration/Onset/Timin days  Location: R inner thigh  Radiation: none  Quality: pain and abscess  Intensity/Severity: moderate  Associated Symptoms: none stated  Aggravating or Alleviating Factors: movement  Previous Episodes: Pt denies a prior hx of these sx      PAST MEDICAL HISTORY  Active Ambulatory Problems     Diagnosis Date Noted   • No Active Ambulatory Problems     Resolved Ambulatory Problems     Diagnosis Date Noted   • No Resolved Ambulatory Problems     Past Medical History:   Diagnosis Date   • Atypical chest pain        PAST SURGICAL HISTORY  History reviewed. No pertinent surgical history.    FAMILY HISTORY  Family History   Problem Relation Age of Onset   • Heart failure Father    • Cancer Father    • Sudden death Father        SOCIAL HISTORY  Social History     Socioeconomic History   • Marital status:      Spouse name: Not on file   • Number of children: Not on file   • Years of education: Not on file   • Highest education level: Not on file   Social Needs   • Financial resource strain: Not on file   • Food insecurity - worry: Not on file   • Food insecurity - inability: Not on file   • Transportation needs - medical: Not on file   • Transportation needs - non-medical: Not on file   Occupational History   • Not on file   Tobacco Use   • Smoking status: Never Smoker   • Smokeless tobacco: Never Used   Substance and Sexual Activity   • Alcohol use: No     Frequency: Never   • Drug use: No   • Sexual activity: Defer   Other Topics Concern   • Not on  file   Social History Narrative   • Not on file       ALLERGIES  Penicillins    REVIEW OF SYSTEMS  Review of Systems   Constitutional: Negative for fever.   Respiratory: Negative for shortness of breath.    Cardiovascular: Negative for chest pain.   Skin:        Pt c/o painful R thigh abscess       PHYSICAL EXAM  ED Triage Vitals [03/11/19 0842]   Temp Heart Rate Resp BP SpO2   97.1 °F (36.2 °C) 99 18 -- 97 %      Temp src Heart Rate Source Patient Position BP Location FiO2 (%)   Tympanic -- -- -- --       Physical Exam   Constitutional: No distress.   HENT:   Head: Normocephalic and atraumatic.   Cardiovascular: Regular rhythm.   Pulmonary/Chest: No respiratory distress.   Abdominal: There is no tenderness.   Musculoskeletal: He exhibits no tenderness.   Skin: No rash noted.   There is a moderate sized abscess to the R inner thigh with warmth and erythema.    Nursing note and vitals reviewed.    PROCEDURES  Incision & Drainage  Date/Time: 3/11/2019 8:56 AM  Performed by: Gaston Kan MD  Authorized by: Gaston Kan MD     Consent:     Consent obtained:  Verbal    Consent given by:  Patient    Risks discussed:  Pain  Location:     Type:  Abscess    Location:  Lower extremity    Lower extremity location:  Leg    Leg location:  R upper leg  Anesthesia (see MAR for exact dosages):     Anesthesia method:  Local infiltration    Local anesthetic:  Lidocaine 1% WITH epi  Procedure type:     Complexity:  Simple  Procedure details:     Needle aspiration: yes      Needle size:  18 G    Incision types:  Stab incision    Incision depth:  Subcutaneous    Scalpel blade:  11    Wound management:  Probed and deloculated    Drainage:  Bloody and purulent    Drainage amount:  Moderate (approximately 3 cc)    Packing materials:  1/2 in iodoform gauze  Post-procedure details:     Patient tolerance of procedure:  Tolerated well, no immediate complications      PROGRESS AND CONSULTS       0856 - Rechecked pt. Performed I&D at  this time. Provided wound care instructions to leave packing in place for the next 24 hours before removing packing. D/w pt the plan to discharge home with bactrim and a referral for a PCP. Provided RTER warnings. Pt understands and agrees with plan. All questions answered.     MEDICAL DECISION MAKING  Results were reviewed/discussed with the patient and they were also made aware of online access. Pt also made aware that some labs, such as cultures, will not be resulted during ER visit and follow up with PMD is necessary.     MDM       DIAGNOSIS  Final diagnoses:   Abscess       DISPOSITION  DISCHARGE    Patient discharged in stable condition.    Reviewed implications of results, diagnosis, meds, responsibility to follow up, warning signs and symptoms of possible worsening, potential complications and reasons to return to ER.    Patient/Family voiced understanding of above instructions.    Discussed plan for discharge, as there is no emergent indication for admission. Patient referred to primary care provider for BP management due to today's BP. Pt/family is agreeable and understands need for follow up and repeat testing.  Pt is aware that discharge does not mean that nothing is wrong but it indicates no emergency is present that requires admission and they must continue care with follow-up as given below or physician of their choice.     FOLLOW-UP  PATIENT LIAISON [facility] [id number] [phone]             Medication List      New Prescriptions    sulfamethoxazole-trimethoprim 800-160 MG per tablet  Commonly known as:  BACTRIM DS  Take 1 tablet by mouth 2 (Two) Times a Day.              Latest Documented Vital Signs:  As of 9:11 AM  BP- 141/91 HR- 99 Temp- 97.1 °F (36.2 °C) (Tympanic) O2 sat- 97%    --  Documentation assistance provided by sherly Jarquin for Dr. Kan.  Information recorded by the sherly was done at my direction and has been verified and validated by me.       Britton  Juan  03/11/19 0911       Gaston Kan MD  03/11/19 0911

## 2019-03-13 ENCOUNTER — HOSPITAL ENCOUNTER (EMERGENCY)
Facility: HOSPITAL | Age: 44
Discharge: HOME | End: 2019-03-13
Attending: EMERGENCY MEDICINE | Admitting: EMERGENCY MEDICINE

## 2019-03-13 VITALS
TEMPERATURE: 97 F | RESPIRATION RATE: 16 BRPM | BODY MASS INDEX: 21.19 KG/M2 | SYSTOLIC BLOOD PRESSURE: 110 MMHG | DIASTOLIC BLOOD PRESSURE: 79 MMHG | HEART RATE: 109 BPM | HEIGHT: 67 IN | OXYGEN SATURATION: 99 % | WEIGHT: 135 LBS

## 2019-03-13 DIAGNOSIS — L02.416 ABSCESS OF LEFT THIGH: Primary | ICD-10-CM

## 2019-03-13 PROCEDURE — 87205 SMEAR GRAM STAIN: CPT | Performed by: EMERGENCY MEDICINE

## 2019-03-13 PROCEDURE — 87147 CULTURE TYPE IMMUNOLOGIC: CPT | Performed by: EMERGENCY MEDICINE

## 2019-03-13 PROCEDURE — 87186 SC STD MICRODIL/AGAR DIL: CPT | Performed by: EMERGENCY MEDICINE

## 2019-03-13 PROCEDURE — 99282 EMERGENCY DEPT VISIT SF MDM: CPT

## 2019-03-13 PROCEDURE — 87070 CULTURE OTHR SPECIMN AEROBIC: CPT | Performed by: EMERGENCY MEDICINE

## 2019-03-13 RX ORDER — LIDOCAINE HYDROCHLORIDE AND EPINEPHRINE 10; 10 MG/ML; UG/ML
10 INJECTION, SOLUTION INFILTRATION; PERINEURAL ONCE
Status: COMPLETED | OUTPATIENT
Start: 2019-03-13 | End: 2019-03-13

## 2019-03-13 RX ADMIN — LIDOCAINE HYDROCHLORIDE AND EPINEPHRINE 10 ML: 10; 10 INJECTION, SOLUTION INFILTRATION; PERINEURAL at 14:14

## 2019-03-13 NOTE — ED PROVIDER NOTES
EMERGENCY DEPARTMENT ENCOUNTER    CHIEF COMPLAINT  Chief Complaint: Cyst  History given by: Patient   History limited by: none   Room Number: 35/35  PMD: Provider, No Known      HPI:  Pt is a 43 y.o. male who presents complaining of cyst to the  L upper inner thigh that was noticed this morning upon waking up. Pt states he had cyst to R upper inner thigh that was I&D in ED setting two days ago. Pt affirms he was discharged with Bactrim.     Duration:  Several hours   Onset: gradual   Timing: constant   Location: L upper inner thigh   Radiation: none   Quality: cyst   Intensity/Severity: mild   Progression: unchanged   Associated Symptoms: none   Aggravating Factors: none   Alleviating Factors: none   Previous Episodes: Pt had similar event 2 days ago.   Treatment before arrival: Pt is on Bactrim.     PAST MEDICAL HISTORY  Active Ambulatory Problems     Diagnosis Date Noted   • No Active Ambulatory Problems     Resolved Ambulatory Problems     Diagnosis Date Noted   • No Resolved Ambulatory Problems     Past Medical History:   Diagnosis Date   • Atypical chest pain        PAST SURGICAL HISTORY  History reviewed. No pertinent surgical history.    FAMILY HISTORY  Family History   Problem Relation Age of Onset   • Heart failure Father    • Cancer Father    • Sudden death Father        SOCIAL HISTORY  Social History     Socioeconomic History   • Marital status:      Spouse name: Not on file   • Number of children: Not on file   • Years of education: Not on file   • Highest education level: Not on file   Social Needs   • Financial resource strain: Not on file   • Food insecurity - worry: Not on file   • Food insecurity - inability: Not on file   • Transportation needs - medical: Not on file   • Transportation needs - non-medical: Not on file   Occupational History   • Not on file   Tobacco Use   • Smoking status: Never Smoker   • Smokeless tobacco: Never Used   Substance and Sexual Activity   • Alcohol use: No      Frequency: Never   • Drug use: No   • Sexual activity: Defer   Other Topics Concern   • Not on file   Social History Narrative   • Not on file       ALLERGIES  Penicillins    REVIEW OF SYSTEMS  Review of Systems   Constitutional: Negative for activity change, appetite change and fever.   HENT: Negative for congestion and sore throat.    Eyes: Negative.    Respiratory: Negative for cough and shortness of breath.    Cardiovascular: Negative for chest pain and leg swelling.   Gastrointestinal: Negative for abdominal pain, diarrhea and vomiting.   Endocrine: Negative.    Genitourinary: Negative for decreased urine volume and dysuria.   Musculoskeletal: Negative for neck pain.   Skin: Positive for wound (cyst to L upper inner thigh). Negative for rash.   Allergic/Immunologic: Negative.    Neurological: Negative for weakness, numbness and headaches.   Hematological: Negative.    Psychiatric/Behavioral: Negative.    All other systems reviewed and are negative.      PHYSICAL EXAM  ED Triage Vitals   Temp Heart Rate Resp BP SpO2   03/13/19 1132 03/13/19 1132 03/13/19 1132 03/13/19 1139 03/13/19 1132   97 °F (36.1 °C) 118 18 123/81 98 %      Temp src Heart Rate Source Patient Position BP Location FiO2 (%)   03/13/19 1132 03/13/19 1132 03/13/19 1139 03/13/19 1139 --   Tympanic Monitor Lying Right arm        Physical Exam   Constitutional: No distress.   HENT:   Head: Normocephalic and atraumatic.   Eyes: EOM are normal.   Neck: Normal range of motion.   Pulmonary/Chest: No respiratory distress.   Abdominal: There is no tenderness.   Musculoskeletal: He exhibits no edema.   Neurological: He is alert.   Skin: Skin is warm and dry.   To L inner thigh - 2cm in diameter raised nodule with area of scabbed over at the center - surrounding erythema. Probable cyst developing   Nursing note and vitals reviewed.    Procedures      PROGRESS AND CONSULTS       1320: Upon pt exam, discussed plan to I&D in ED. Supplies and lidocaine ordered  to bedside. Wound cx ordered.     1405: Discussed cyst with Nicky Reeder PA-C who agreed to drain cyst. See her note for procedure details. Discussed plan to discharge pt following procedure to continue Bactrim and culture wound.     1445: Pt rechecked and resting comfortably. Discussed plan to discharge to continue bactrim and cx wound. Pt understands and agrees with plan, all questions addressed.     MEDICAL DECISION MAKING  Results were reviewed/discussed with the patient and they were also made aware of online access. Pt also made aware that some labs, such as cultures, will not be resulted during ER visit and follow up with PMD is necessary.     MDM       DIAGNOSIS  Final diagnoses:   Abscess of left thigh       DISPOSITION  DISCHARGE    Patient discharged in stable condition.    Reviewed implications of results, diagnosis, meds, responsibility to follow up, warning signs and symptoms of possible worsening, potential complications and reasons to return to ER.    Patient/Family voiced understanding of above instructions.    Discussed plan for discharge, as there is no emergent indication for admission. Patient referred to primary care provider for BP management due to today's BP. Pt/family is agreeable and understands need for follow up and repeat testing.  Pt is aware that discharge does not mean that nothing is wrong but it indicates no emergency is present that requires admission and they must continue care with follow-up as given below or physician of their choice.     FOLLOW-UP  No follow-up provider specified.       Medication List      No changes were made to your prescriptions during this visit.           Latest Documented Vital Signs:  As of 2:34 PM  BP- 104/72 HR- 118 Temp- 97 °F (36.1 °C) (Tympanic) O2 sat- 98%    --  Documentation assistance provided by sherly rPakash for Dr. Man.  Information recorded by the sherly was done at my direction and has been verified and validated by me.                 Raina Prakash  03/13/19 1446       Gerson Man MD  03/13/19 7424

## 2019-03-13 NOTE — ED PROVIDER NOTES
Incision & Drainage  Date/Time: 3/13/2019 2:11 PM  Performed by: Nicky Reeder PA  Authorized by: Nicky Reeder PA     Consent:     Consent obtained:  Verbal    Consent given by:  Patient    Risks discussed:  Bleeding, incomplete drainage and pain  Location:     Type:  Cyst    Location:  Lower extremity    Lower extremity location:  Leg    Leg location:  L upper leg  Pre-procedure details:     Skin preparation:  Betadine  Anesthesia (see MAR for exact dosages):     Anesthesia method:  Local infiltration    Local anesthetic:  Lidocaine 1% WITH epi  Procedure type:     Complexity:  Simple  Procedure details:     Incision types:  Single straight    Scalpel blade:  11    Wound management:  Probed and deloculated    Drainage:  Bloody    Drainage amount:  Scant    Wound treatment:  Wound left open    Packing materials:  None  Post-procedure details:     Patient tolerance of procedure:  Tolerated well, no immediate complications            Documentation assistance provided by sherly Merino for Carlene Reeder PA-C.  Information recorded by the scribe was done at my direction and has been verified and validated by me.         Raven Merino  03/13/19 1421       Nicky Reeder PA  03/13/19 1424

## 2019-03-15 LAB
BACTERIA SPEC AEROBE CULT: ABNORMAL
GRAM STN SPEC: ABNORMAL
GRAM STN SPEC: ABNORMAL

## 2022-05-05 PROCEDURE — 0051A HC ADM SARSCV2 30MCG TRS-SUCR 1ST DOSE: CPT | Performed by: INTERNAL MEDICINE

## 2022-05-05 PROCEDURE — 91305 HC SARSCOV2 VAC 30 MCG TRS-SUCR PFIZER: CPT | Performed by: INTERNAL MEDICINE

## 2022-05-06 ENCOUNTER — IMMUNIZATION (OUTPATIENT)
Dept: VACCINE CLINIC | Facility: HOSPITAL | Age: 47
End: 2022-05-06

## 2022-05-06 DIAGNOSIS — Z23 NEED FOR VACCINATION: Primary | ICD-10-CM
